# Patient Record
(demographics unavailable — no encounter records)

---

## 2025-04-02 NOTE — PHYSICAL EXAM
[No Acute Distress] : no acute distress [Normal Sclera/Conjunctiva] : normal sclera/conjunctiva [Normal Outer Ear/Nose] : the outer ears and nose were normal in appearance [No JVD] : no jugular venous distention [No Respiratory Distress] : no respiratory distress  [Normal Rate] : normal rate  [Regular Rhythm] : with a regular rhythm [Normal S1, S2] : normal S1 and S2 [Soft] : abdomen soft [Non Tender] : non-tender [No Rash] : no rash [Coordination Grossly Intact] : coordination grossly intact

## 2025-04-07 NOTE — HISTORY OF PRESENT ILLNESS
[FreeTextEntry1] : establish care [de-identified] : The patient is a 47 year old female presenting to [facility] care.  Notes pmhx of Iron-def anemia on iron supplements She was evaluated by ENT and had audiometric testing done which was negative for bppv after visiting an urgent care in Feb 2025 for vertigo-like symptoms. Patient sees ENT Dr. Morgan. Notes that she has tried a trial of meclizine but is unsure if that helped since her vertigo has occurred intermittently.  Patient notes c/o intermittent frontal headaches associated w/dizziness spells, last episode was approximately 1 month ago.  She notes photosensitivity during the episodes but not prior to, no phonophobia.  She denies any abd pain, hematochezia, melena, no hematuria/dysuria.   surgical hx: 1 termination of pregnancy  paternal hx of CAD and DM  Maternal hx of HTN and HLD Patient notes her last MMG was in 2024, she completes them annually sees obpedron regulary  currently taking iron supplments and magnesium supplements.

## 2025-04-07 NOTE — HISTORY OF PRESENT ILLNESS
[FreeTextEntry1] : establish care [de-identified] : The patient is a 47 year old female presenting to [facility] care.  Notes pmhx of Iron-def anemia on iron supplements She was evaluated by ENT and had audiometric testing done which was negative for bppv after visiting an urgent care in Feb 2025 for vertigo-like symptoms. Patient sees ENT Dr. Morgan. Notes that she has tried a trial of meclizine but is unsure if that helped since her vertigo has occurred intermittently.  Patient notes c/o intermittent frontal headaches associated w/dizziness spells, last episode was approximately 1 month ago.  She notes photosensitivity during the episodes but not prior to, no phonophobia.  She denies any abd pain, hematochezia, melena, no hematuria/dysuria.   surgical hx: 1 termination of pregnancy  paternal hx of CAD and DM  Maternal hx of HTN and HLD Patient notes her last MMG was in 2024, she completes them annually sees obpedron regulary  currently taking iron supplments and magnesium supplements.

## 2025-04-07 NOTE — ASSESSMENT
[FreeTextEntry1] : 47 year old female presenting to establish care w/pcp pmhx of MARA currently being worked up for dizziness w/ENT and has apt w/neurology  UTD w/MMG and GYN, never had colonoscopy   #Dizziness #Hx of iron def anemia  #HCM - seen by ENT, underwent audiometric testing, non-specific and showed some vestibular dysfunction  - tried meclizine but no improvement, last episode of dizziness was 1 month ago  - neurology f/u  - sees Dr. Phelps ent  - routine labs  - mmg ordered  - gi referral for screening colo - no alcohol consumption, no ivdu, no smoking, intermittent cannabis endorsed HCM and hx  surgical hx: 1 termination of pregnancy paternal hx of CAD and DM Maternal hx of HTN and HLD Patient notes her last MMG was in 2024, she completes them annually sees obgyn regulary currently taking iron supplments and magnesium supplements.

## 2025-04-07 NOTE — REVIEW OF SYSTEMS
[Fever] : no fever [Chills] : no chills [Hearing Loss] : no hearing loss [Chest Pain] : no chest pain [Palpitations] : no palpitations [Shortness Of Breath] : no shortness of breath [Wheezing] : no wheezing [Abdominal Pain] : no abdominal pain [Diarrhea] : diarrhea [Melena] : no melena [Dysuria] : no dysuria [Joint Pain] : no joint pain

## 2025-04-07 NOTE — ASSESSMENT
[FreeTextEntry1] : 47 year old female presenting to establish care w/pcp pmhx of MAAR currently being worked up for dizziness w/ENT and has apt w/neurology  UTD w/MMG and GYN, never had colonoscopy   #Dizziness #Hx of iron def anemia  #HCM - seen by ENT, underwent audiometric testing, non-specific and showed some vestibular dysfunction  - tried meclizine but no improvement, last episode of dizziness was 1 month ago  - neurology f/u  - sees Dr. Phelps ent  - routine labs  - mmg ordered  - gi referral for screening colo - no alcohol consumption, no ivdu, no smoking, intermittent cannabis endorsed HCM and hx  surgical hx: 1 termination of pregnancy paternal hx of CAD and DM Maternal hx of HTN and HLD Patient notes her last MMG was in 2024, she completes them annually sees obgyn regulary currently taking iron supplments and magnesium supplements.

## 2025-06-04 NOTE — ASSESSMENT
[FreeTextEntry1] : 46 yo F, no regular substance use (some isolated days drink and smoke), , lives with family, with PMHx headaches; PFHx with son with headaches. Started to present with headaches in her 20s. Frontal headaches, pounding, for 1 or 2 days, nauseous, photophobia and phonophobia, intensity 6-8/10, improves when she is quiet in a dark, treats with Exedrin every four hour and helps. Always perimenstrual. One or two months per month. No head imaging done. Recently started with episodes of vertigo for seconds while sleeping always and this type of headache. A total of 7 episodes always connected with headaches in the middle of the night. Stress might trigger these episodes. Last vertigo in April and migraine last week.   Impression: Episodic infrequent migraine without aura perimenstrual with associated recurrent brief episodic vertigo (brainstem migraine vs BPPV)  PLAN:  - Headache diary - F/u labs - Start Maxalt 10 mg inhaled when start headache, if no improvement can repeat after 2 hours. Then can repeat every 24 hours.  - Can do a short mefemenic acid 250 mg q8h course around her period - If epigastric burning sensation, can take pantoprazole 40 mg QD during mefemenic acid  - If nausea, can get ondansetron 4 mg q4h PRN - If vertigo, can get meclizine 12.5 mg q6h PRN - Order brain MRI wo contrast - Discussed the potential side effects of the current medications, patient was understanding and agreeable. - RTC in 3 months  Counselling: - Patient was instructed to take the abortive medications once the headache starts. - He was advised to stay in a calm quite place during headache. - Discussed the need to avoid common triggers - Lifestyle changes that help: physical exercise, fixed meal time, fixed sleep/wake cycle, avoid smoking and highly caffeinated products. - Patient was advised to avoid OTC analgesics which can increase frequency and can cause medication overuse headache.

## 2025-06-04 NOTE — ASSESSMENT
[FreeTextEntry1] : 48 yo F, no regular substance use (some isolated days drink and smoke), , lives with family, with PMHx headaches; PFHx with son with headaches. Started to present with headaches in her 20s. Frontal headaches, pounding, for 1 or 2 days, nauseous, photophobia and phonophobia, intensity 6-8/10, improves when she is quiet in a dark, treats with Exedrin every four hour and helps. Always perimenstrual. One or two months per month. No head imaging done. Recently started with episodes of vertigo for seconds while sleeping always and this type of headache. A total of 7 episodes always connected with headaches in the middle of the night. Stress might trigger these episodes. Last vertigo in April and migraine last week.   Impression: Episodic infrequent migraine without aura perimenstrual with associated recurrent brief episodic vertigo (brainstem migraine vs BPPV)  PLAN:  - Headache diary - F/u labs - Start Maxalt 10 mg inhaled when start headache, if no improvement can repeat after 2 hours. Then can repeat every 24 hours.  - Can do a short mefemenic acid 250 mg q8h course around her period - If epigastric burning sensation, can take pantoprazole 40 mg QD during mefemenic acid  - If nausea, can get ondansetron 4 mg q4h PRN - If vertigo, can get meclizine 12.5 mg q6h PRN - Order brain MRI wo contrast - Discussed the potential side effects of the current medications, patient was understanding and agreeable. - RTC in 3 months  Counselling: - Patient was instructed to take the abortive medications once the headache starts. - He was advised to stay in a calm quite place during headache. - Discussed the need to avoid common triggers - Lifestyle changes that help: physical exercise, fixed meal time, fixed sleep/wake cycle, avoid smoking and highly caffeinated products. - Patient was advised to avoid OTC analgesics which can increase frequency and can cause medication overuse headache.

## 2025-06-04 NOTE — PHYSICAL EXAM
[FreeTextEntry1] : Constitutional: alert, in no acute distress and well nourished. Neurologic: Orientation: oriented to person, oriented to place and oriented to time. Memory: short term memory intact. Fund of knowledge: displays adequate range of vocabulary. Cranial Nerves: visual acuity intact bilaterally, visual fields full to confrontation, pupils equal round and reactive to light, extraocular motion intact, facial sensation intact symmetrically, face symmetrical, hearing was intact bilaterally, head turning and shoulder shrug symmetric and there was no tongue deviation with protrusion. Motor: muscle tone was normal in all four extremities, muscle strength was normal in all four extremities, no involuntary movements were seen and normal bulk in all four extremities. Sensory exam: light touch was intact and vibration was intact. Coordination:. normal gait. balance was intact. Deep tendon reflexes: Biceps right 2+. Biceps left 2+.  Patella right 2+. Patella left 2+.

## 2025-06-04 NOTE — END OF VISIT
[] : Resident [FreeTextEntry3] : New evaluation for migraines Migraines have changed over the last many years and now having consistent vertigo with migraines Occurs in evenings and usually from sleep  Plan as above [Time Spent: ___ minutes] : I have spent [unfilled] minutes of time on the encounter which excludes teaching and separately reported services.

## 2025-06-04 NOTE — HISTORY OF PRESENT ILLNESS
[FreeTextEntry1] : 46 yo F, no regular substance use (some isolated days drink and smoke), , lives with family, with PMHx headaches; PFHx with son with headaches. Started to present with headaches in her 20s. Frontal headaches, pounding, for 1 or 2 days, nauseous, photophobia and phonophobia, intensity 6-8/10, improves when she is quiet in a dark, treats with Exedrin every four hour and helps. Always perimenstrual. One or two months per month. No head imaging done. Recently started with episodes of vertigo for seconds while sleeping always and this type of headache. A total of 7 episodes always connected with headaches in the middle of the night. Stress might trigger these episodes. Last vertigo in April and migraine last week.

## 2025-06-04 NOTE — HISTORY OF PRESENT ILLNESS
[FreeTextEntry1] : 48 yo F, no regular substance use (some isolated days drink and smoke), , lives with family, with PMHx headaches; PFHx with son with headaches. Started to present with headaches in her 20s. Frontal headaches, pounding, for 1 or 2 days, nauseous, photophobia and phonophobia, intensity 6-8/10, improves when she is quiet in a dark, treats with Exedrin every four hour and helps. Always perimenstrual. One or two months per month. No head imaging done. Recently started with episodes of vertigo for seconds while sleeping always and this type of headache. A total of 7 episodes always connected with headaches in the middle of the night. Stress might trigger these episodes. Last vertigo in April and migraine last week.